# Patient Record
(demographics unavailable — no encounter records)

---

## 2025-07-09 NOTE — HISTORY OF PRESENT ILLNESS
[Previously active] : previously active [Vaginal] : vaginal [No] : No [Condoms] : Condoms [Patient would like to be screened for STIs] : Patient would like to be screened for STIs [FreeTextEntry1] : 17 yo P0 LMP 6/30/25 presents for establishment of care and annual exam. Pt states regular monthly periods.

## 2025-07-09 NOTE — END OF VISIT
[FreeTextEntry3] : I, Donita Cali, acted as a scribe on behalf of Dr. Denisha Davila M.D., on 07/09/2025.   All medical entries made by the scribe were at my, Dr. Denisha Davila M.D., direction and personally dictated by me on 07/09/2025. I have reviewed the chart and agree that the record accurately reflects my personal performance of the history, physical exam, assessment and plan. I have also personally directed, reviewed, and agreed with the chart.

## 2025-07-09 NOTE — PHYSICAL EXAM
[Appropriately responsive] : appropriately responsive [Alert] : alert [No Acute Distress] : no acute distress [Soft] : soft [Non-tender] : non-tender [Non-distended] : non-distended [No HSM] : No HSM [No Lesions] : no lesions [No Mass] : no mass [Oriented x3] : oriented x3 [Examination Of The Breasts] : a normal appearance [No Discharge] : no discharge [No Masses] : no breast masses were palpable [Labia Majora] : normal [Normal] :  normal

## 2025-07-09 NOTE — DISCUSSION/SUMMARY
[FreeTextEntry1] : - Contraceptive options reviewed; pt opted for condoms - STI testing done today - Spent 5 minutes of one-on-one interaction regarding safe sexual practices   PHQ9 Screenin min RTO in one year for annual exam/PRN.